# Patient Record
Sex: MALE | Race: WHITE
[De-identification: names, ages, dates, MRNs, and addresses within clinical notes are randomized per-mention and may not be internally consistent; named-entity substitution may affect disease eponyms.]

---

## 2022-02-24 ENCOUNTER — HOSPITAL ENCOUNTER (EMERGENCY)
Dept: HOSPITAL 4 - SED | Age: 87
Discharge: HOME | End: 2022-02-24
Payer: COMMERCIAL

## 2022-02-24 VITALS — SYSTOLIC BLOOD PRESSURE: 109 MMHG

## 2022-02-24 VITALS — HEIGHT: 64 IN | BODY MASS INDEX: 38.41 KG/M2 | WEIGHT: 225 LBS

## 2022-02-24 VITALS — SYSTOLIC BLOOD PRESSURE: 108 MMHG

## 2022-02-24 DIAGNOSIS — E11.9: ICD-10-CM

## 2022-02-24 DIAGNOSIS — M25.551: Primary | ICD-10-CM

## 2022-02-24 DIAGNOSIS — Z79.899: ICD-10-CM

## 2022-02-24 DIAGNOSIS — M25.561: ICD-10-CM

## 2022-02-24 DIAGNOSIS — M25.562: ICD-10-CM

## 2022-02-24 DIAGNOSIS — I10: ICD-10-CM

## 2022-02-24 NOTE — NUR
Patient/family given written and verbal discharge instructions and verbalizes 
understanding.  ER MD discussed with family the results and treatment provided. 
Patient in stable condition.  No Rx given. Patient educated on pain management 
and to follow up with PMD. Pain Scale 2/10. Opportunity for questions provided 
and answered. X-ray disc given to family.

## 2022-02-24 NOTE — NUR
Pt brought by MANUELA [email or web address], pt presents to ER with R hip/ R thigh/ R leg 
pain  for couple weeks, denies recent trauma, skin pink and warm, cap refill 
<3, VSS, respirations even and unlabored.

## 2023-08-06 ENCOUNTER — HOSPITAL ENCOUNTER (INPATIENT)
Dept: HOSPITAL 4 - SED | Age: 88
LOS: 9 days | Discharge: NURSING, REHAB OR LONG-TERM CARE | DRG: 393 | End: 2023-08-15
Attending: SPECIALIST | Admitting: SPECIALIST
Payer: COMMERCIAL

## 2023-08-06 VITALS
HEART RATE: 86 BPM | RESPIRATION RATE: 19 BRPM | BODY MASS INDEX: 32.95 KG/M2 | SYSTOLIC BLOOD PRESSURE: 122 MMHG | OXYGEN SATURATION: 97 % | WEIGHT: 193 LBS | TEMPERATURE: 98.2 F | HEIGHT: 64 IN

## 2023-08-06 VITALS — HEART RATE: 93 BPM | TEMPERATURE: 97.2 F | SYSTOLIC BLOOD PRESSURE: 100 MMHG | RESPIRATION RATE: 18 BRPM

## 2023-08-06 DIAGNOSIS — E78.5: ICD-10-CM

## 2023-08-06 DIAGNOSIS — K64.8: Primary | ICD-10-CM

## 2023-08-06 DIAGNOSIS — Z79.01: ICD-10-CM

## 2023-08-06 DIAGNOSIS — I42.9: ICD-10-CM

## 2023-08-06 DIAGNOSIS — K21.9: ICD-10-CM

## 2023-08-06 DIAGNOSIS — K80.20: ICD-10-CM

## 2023-08-06 DIAGNOSIS — K63.5: ICD-10-CM

## 2023-08-06 DIAGNOSIS — Z95.1: ICD-10-CM

## 2023-08-06 DIAGNOSIS — R79.1: ICD-10-CM

## 2023-08-06 DIAGNOSIS — D69.6: ICD-10-CM

## 2023-08-06 DIAGNOSIS — E11.40: ICD-10-CM

## 2023-08-06 DIAGNOSIS — I25.10: ICD-10-CM

## 2023-08-06 DIAGNOSIS — F02.80: ICD-10-CM

## 2023-08-06 DIAGNOSIS — I21.A1: ICD-10-CM

## 2023-08-06 DIAGNOSIS — Z86.73: ICD-10-CM

## 2023-08-06 DIAGNOSIS — E11.65: ICD-10-CM

## 2023-08-06 DIAGNOSIS — I11.0: ICD-10-CM

## 2023-08-06 DIAGNOSIS — K29.70: ICD-10-CM

## 2023-08-06 DIAGNOSIS — Z79.899: ICD-10-CM

## 2023-08-06 DIAGNOSIS — E87.6: ICD-10-CM

## 2023-08-06 DIAGNOSIS — E43: ICD-10-CM

## 2023-08-06 DIAGNOSIS — Z95.5: ICD-10-CM

## 2023-08-06 DIAGNOSIS — G30.9: ICD-10-CM

## 2023-08-06 DIAGNOSIS — N40.0: ICD-10-CM

## 2023-08-06 DIAGNOSIS — I50.9: ICD-10-CM

## 2023-08-06 DIAGNOSIS — D50.9: ICD-10-CM

## 2023-08-06 DIAGNOSIS — E83.51: ICD-10-CM

## 2023-08-06 LAB
ACETONE EXG QL: NEGATIVE
ALBUMIN SERPL BCP-MCNC: 2.7 G/DL (ref 3.4–4.8)
ALT SERPL W P-5'-P-CCNC: 8 U/L (ref 12–78)
ANION GAP SERPL CALCULATED.3IONS-SCNC: 11 MMOL/L (ref 5–15)
ANISOCYTOSIS BLD QL: (no result)
APPEARANCE UR: CLEAR
AST SERPL W P-5'-P-CCNC: 18 U/L (ref 10–37)
BASOPHILS # BLD AUTO: 0 K/UL (ref 0–0.2)
BASOPHILS NFR BLD AUTO: 0.5 % (ref 0–2)
BILIRUB SERPL-MCNC: 0.6 MG/DL (ref 0–1)
BILIRUB UR QL STRIP: NEGATIVE
BUN SERPL-MCNC: 21 MG/DL (ref 8–21)
CALCIUM SERPL-MCNC: 7.9 MG/DL (ref 8.4–11)
CHLORIDE SERPL-SCNC: 105 MMOL/L (ref 98–107)
CK SERPL-CCNC: 70 U/L (ref 39–308)
CO2 SERPLBLD-SCNC: 27 MMOL/L (ref 23–29)
COLOR UR: YELLOW
CREAT SERPL-MCNC: 1.22 MG/DL (ref 0.55–1.3)
EOSINOPHIL # BLD AUTO: 0 K/UL (ref 0–0.4)
EOSINOPHIL NFR BLD AUTO: 0.2 % (ref 0–4)
ERYTHROCYTE [DISTWIDTH] IN BLOOD BY AUTOMATED COUNT: 18.3 % (ref 9–15)
GFR SERPL CREATININE-BSD FRML MDRD: (no result) ML/MIN (ref 90–?)
GLUCOSE SERPL-MCNC: 122 MG/DL (ref 74–106)
GLUCOSE UR STRIP-MCNC: NEGATIVE MG/DL
HCT VFR BLD AUTO: 14.8 % (ref 36–54)
HGB BLD-MCNC: 4.3 G/DL (ref 14–18)
HGB UR QL STRIP: NEGATIVE
HYPOCHROMIA BLD QL: (no result)
INR PPP: 1.6 (ref 0.8–1.2)
KETONES UR STRIP-MCNC: NEGATIVE MG/DL
LEUKOCYTE ESTERASE UR QL STRIP: NEGATIVE
LYMPHOCYTES # BLD AUTO: 1.5 K/UL (ref 1–5.5)
LYMPHOCYTES NFR BLD AUTO: 21 % (ref 20.5–51.5)
MCH RBC QN AUTO: 20 PG (ref 27–31)
MCHC RBC AUTO-ENTMCNC: 29 % (ref 32–36)
MCV RBC AUTO: 68 FL (ref 79–98)
MONOCYTES # BLD MANUAL: 0.8 K/UL (ref 0–1)
MONOCYTES # BLD MANUAL: 10.9 % (ref 1.7–9.3)
NEUTROPHILS # BLD AUTO: 4.7 K/UL (ref 1.8–7.7)
NEUTROPHILS NFR BLD AUTO: 67.4 % (ref 40–70)
NITRITE UR QL STRIP: NEGATIVE
OVALOCYTES BLD QL SMEAR: (no result)
PH UR STRIP: 7.5 [PH] (ref 5–8)
PLATELET # BLD AUTO: 121 K/UL (ref 130–430)
POTASSIUM SERPL-SCNC: 4.2 MMOL/L (ref 3.5–5.1)
PROT SERPL-MCNC: 5.8 G/DL (ref 6.4–8.3)
PROT UR QL STRIP: NEGATIVE
PROTHROMBIN TIME: 16.2 SECS (ref 9.5–12.5)
RBC # BLD AUTO: 2.16 MIL/UL (ref 4.2–6.2)
SODIUM SERPLBLD-SCNC: 143 MMOL/L (ref 136–145)
SP GR UR STRIP: 1.01 (ref 1–1.03)
T4 FREE SERPL-MCNC: 1.1 NG/DL (ref 0.6–1.6)
TSH SERPL DL<=0.05 MIU/L-ACNC: 1.56 UIU/ML (ref 0.34–4.82)
UROBILINOGEN UR STRIP-MCNC: 0.2 MG/DL (ref 0.2–1)
WBC # BLD AUTO: 7 K/UL (ref 4.8–10.8)

## 2023-08-06 PROCEDURE — P9021 RED BLOOD CELLS UNIT: HCPCS

## 2023-08-06 PROCEDURE — G0378 HOSPITAL OBSERVATION PER HR: HCPCS

## 2023-08-06 PROCEDURE — 30233N1 TRANSFUSION OF NONAUTOLOGOUS RED BLOOD CELLS INTO PERIPHERAL VEIN, PERCUTANEOUS APPROACH: ICD-10-PCS | Performed by: FAMILY MEDICINE

## 2023-08-07 VITALS
SYSTOLIC BLOOD PRESSURE: 97 MMHG | OXYGEN SATURATION: 93 % | HEART RATE: 83 BPM | TEMPERATURE: 99.3 F | RESPIRATION RATE: 19 BRPM

## 2023-08-07 VITALS
TEMPERATURE: 98.2 F | HEART RATE: 83 BPM | SYSTOLIC BLOOD PRESSURE: 99 MMHG | OXYGEN SATURATION: 90 % | RESPIRATION RATE: 18 BRPM

## 2023-08-07 VITALS
HEART RATE: 84 BPM | SYSTOLIC BLOOD PRESSURE: 88 MMHG | RESPIRATION RATE: 20 BRPM | OXYGEN SATURATION: 95 % | TEMPERATURE: 98.5 F

## 2023-08-07 VITALS
HEART RATE: 88 BPM | SYSTOLIC BLOOD PRESSURE: 94 MMHG | OXYGEN SATURATION: 96 % | TEMPERATURE: 99 F | RESPIRATION RATE: 19 BRPM

## 2023-08-07 VITALS
RESPIRATION RATE: 14 BRPM | TEMPERATURE: 96 F | OXYGEN SATURATION: 96 % | HEART RATE: 65 BPM | SYSTOLIC BLOOD PRESSURE: 118 MMHG

## 2023-08-07 LAB
ALBUMIN SERPL BCP-MCNC: 2.4 G/DL (ref 3.4–4.8)
ALT SERPL W P-5'-P-CCNC: 6 U/L (ref 12–78)
ANION GAP SERPL CALCULATED.3IONS-SCNC: 8 MMOL/L (ref 5–15)
AST SERPL W P-5'-P-CCNC: 19 U/L (ref 10–37)
BASOPHILS # BLD AUTO: 0 K/UL (ref 0–0.2)
BASOPHILS NFR BLD AUTO: 0.6 % (ref 0–2)
BILIRUB SERPL-MCNC: 1.2 MG/DL (ref 0–1)
BUN SERPL-MCNC: 23 MG/DL (ref 8–21)
CALCIUM SERPL-MCNC: 7.5 MG/DL (ref 8.4–11)
CHLORIDE SERPL-SCNC: 107 MMOL/L (ref 98–107)
CO2 SERPLBLD-SCNC: 28 MMOL/L (ref 23–29)
CREAT SERPL-MCNC: 1.06 MG/DL (ref 0.55–1.3)
EOSINOPHIL # BLD AUTO: 0 K/UL (ref 0–0.4)
EOSINOPHIL NFR BLD AUTO: 0.4 % (ref 0–4)
ERYTHROCYTE [DISTWIDTH] IN BLOOD BY AUTOMATED COUNT: 20.8 % (ref 9–15)
GFR SERPL CREATININE-BSD FRML MDRD: (no result) ML/MIN (ref 90–?)
GLUCOSE SERPL-MCNC: 112 MG/DL (ref 74–106)
HCT VFR BLD AUTO: 21.3 % (ref 36–54)
HGB BLD-MCNC: 6.6 G/DL (ref 14–18)
LYMPHOCYTES # BLD AUTO: 1.3 K/UL (ref 1–5.5)
LYMPHOCYTES NFR BLD AUTO: 19 % (ref 20.5–51.5)
MCH RBC QN AUTO: 23 PG (ref 27–31)
MCHC RBC AUTO-ENTMCNC: 31 % (ref 32–36)
MCV RBC AUTO: 73 FL (ref 79–98)
MONOCYTES # BLD MANUAL: 0.8 K/UL (ref 0–1)
MONOCYTES # BLD MANUAL: 11.6 % (ref 1.7–9.3)
NEUTROPHILS # BLD AUTO: 4.8 K/UL (ref 1.8–7.7)
NEUTROPHILS NFR BLD AUTO: 68.4 % (ref 40–70)
PHOSPHATE SERPL-MCNC: 3.1 MG/DL (ref 2.7–4.5)
PLATELET # BLD AUTO: 108 K/UL (ref 130–430)
POTASSIUM SERPL-SCNC: 3.8 MMOL/L (ref 3.5–5.1)
PROT SERPL-MCNC: 5.2 G/DL (ref 6.4–8.3)
RBC # BLD AUTO: 2.91 MIL/UL (ref 4.2–6.2)
SODIUM SERPLBLD-SCNC: 143 MMOL/L (ref 136–145)
WBC # BLD AUTO: 7 K/UL (ref 4.8–10.8)

## 2023-08-07 RX ADMIN — Medication SCH UNIT: at 08:29

## 2023-08-07 RX ADMIN — FINASTERIDE SCH MG: 5 TABLET, FILM COATED ORAL at 08:30

## 2023-08-07 RX ADMIN — PANTOPRAZOLE SODIUM SCH MG: 40 TABLET, DELAYED RELEASE ORAL at 08:29

## 2023-08-07 RX ADMIN — MECLIZINE HYDROCHLORIDE SCH MG: 25 TABLET ORAL at 08:29

## 2023-08-07 RX ADMIN — SIMVASTATIN SCH MG: 40 TABLET, FILM COATED ORAL at 20:51

## 2023-08-07 RX ADMIN — TAMSULOSIN HYDROCHLORIDE SCH MG: 0.4 CAPSULE ORAL at 08:30

## 2023-08-07 RX ADMIN — LUBIPROSTONE SCH MCG: 24 CAPSULE, GELATIN COATED ORAL at 08:30

## 2023-08-07 RX ADMIN — POTASSIUM CHLORIDE SCH MEQ: 750 TABLET, FILM COATED, EXTENDED RELEASE ORAL at 09:31

## 2023-08-07 RX ADMIN — Medication SCH GM: at 08:30

## 2023-08-07 RX ADMIN — GABAPENTIN SCH MG: 300 CAPSULE ORAL at 20:51

## 2023-08-07 RX ADMIN — MECLIZINE HYDROCHLORIDE SCH MG: 25 TABLET ORAL at 20:51

## 2023-08-07 RX ADMIN — GLYCERIN SCH ML: .002; .002; .01 SOLUTION/ DROPS OPHTHALMIC at 14:03

## 2023-08-07 RX ADMIN — Medication SCH MCG: at 08:30

## 2023-08-07 RX ADMIN — MECLIZINE HYDROCHLORIDE SCH MG: 25 TABLET ORAL at 18:07

## 2023-08-07 RX ADMIN — GABAPENTIN SCH MG: 300 CAPSULE ORAL at 08:30

## 2023-08-07 RX ADMIN — CLOPIDOGREL BISULFATE SCH MG: 75 TABLET, FILM COATED ORAL at 09:27

## 2023-08-07 RX ADMIN — GABAPENTIN SCH MG: 300 CAPSULE ORAL at 18:06

## 2023-08-07 RX ADMIN — FUROSEMIDE SCH MG: 40 TABLET ORAL at 08:29

## 2023-08-07 RX ADMIN — Medication SCH ML: at 20:54

## 2023-08-07 RX ADMIN — CYCLOSPORINE SCH ML: 0.5 EMULSION OPHTHALMIC at 20:55

## 2023-08-07 RX ADMIN — LUBIPROSTONE SCH MCG: 24 CAPSULE, GELATIN COATED ORAL at 20:51

## 2023-08-07 RX ADMIN — Medication SCH GM: at 20:51

## 2023-08-07 RX ADMIN — CYCLOSPORINE SCH ML: 0.5 EMULSION OPHTHALMIC at 09:34

## 2023-08-07 RX ADMIN — MULTIPLE VITAMINS W/ MINERALS TAB SCH TAB: TAB at 08:29

## 2023-08-08 VITALS
TEMPERATURE: 96.9 F | HEART RATE: 76 BPM | RESPIRATION RATE: 19 BRPM | SYSTOLIC BLOOD PRESSURE: 96 MMHG | OXYGEN SATURATION: 96 %

## 2023-08-08 VITALS
SYSTOLIC BLOOD PRESSURE: 116 MMHG | HEART RATE: 76 BPM | OXYGEN SATURATION: 96 % | RESPIRATION RATE: 19 BRPM | TEMPERATURE: 97.6 F

## 2023-08-08 VITALS
RESPIRATION RATE: 14 BRPM | OXYGEN SATURATION: 96 % | TEMPERATURE: 97.6 F | HEART RATE: 78 BPM | SYSTOLIC BLOOD PRESSURE: 100 MMHG

## 2023-08-08 VITALS
OXYGEN SATURATION: 96 % | HEART RATE: 76 BPM | TEMPERATURE: 98 F | SYSTOLIC BLOOD PRESSURE: 138 MMHG | RESPIRATION RATE: 14 BRPM

## 2023-08-08 VITALS
OXYGEN SATURATION: 95 % | HEART RATE: 93 BPM | TEMPERATURE: 99.7 F | RESPIRATION RATE: 20 BRPM | SYSTOLIC BLOOD PRESSURE: 113 MMHG

## 2023-08-08 VITALS — OXYGEN SATURATION: 97 %

## 2023-08-08 VITALS — OXYGEN SATURATION: 96 %

## 2023-08-08 LAB
ANION GAP SERPL CALCULATED.3IONS-SCNC: 5 MMOL/L (ref 5–15)
BASOPHILS # BLD AUTO: 0 K/UL (ref 0–0.2)
BASOPHILS NFR BLD AUTO: 0.4 % (ref 0–2)
BUN SERPL-MCNC: 19 MG/DL (ref 8–21)
CALCIUM SERPL-MCNC: 7.8 MG/DL (ref 8.4–11)
CHLORIDE SERPL-SCNC: 106 MMOL/L (ref 98–107)
CO2 SERPLBLD-SCNC: 28 MMOL/L (ref 23–29)
CREAT SERPL-MCNC: 1.01 MG/DL (ref 0.55–1.3)
EOSINOPHIL # BLD AUTO: 0.1 K/UL (ref 0–0.4)
EOSINOPHIL NFR BLD AUTO: 1.5 % (ref 0–4)
ERYTHROCYTE [DISTWIDTH] IN BLOOD BY AUTOMATED COUNT: 20.6 % (ref 9–15)
GFR SERPL CREATININE-BSD FRML MDRD: (no result) ML/MIN (ref 90–?)
GLUCOSE SERPL-MCNC: 111 MG/DL (ref 74–106)
HCT VFR BLD AUTO: 27.7 % (ref 36–54)
HGB BLD-MCNC: 8.8 G/DL (ref 14–18)
LYMPHOCYTES # BLD AUTO: 1.7 K/UL (ref 1–5.5)
LYMPHOCYTES NFR BLD AUTO: 23.4 % (ref 20.5–51.5)
MCH RBC QN AUTO: 24 PG (ref 27–31)
MCHC RBC AUTO-ENTMCNC: 32 % (ref 32–36)
MCV RBC AUTO: 76 FL (ref 79–98)
MONOCYTES # BLD MANUAL: 0.8 K/UL (ref 0–1)
MONOCYTES # BLD MANUAL: 10.8 % (ref 1.7–9.3)
NEUTROPHILS # BLD AUTO: 4.6 K/UL (ref 1.8–7.7)
NEUTROPHILS NFR BLD AUTO: 63.9 % (ref 40–70)
PLATELET # BLD AUTO: 97 K/UL (ref 130–430)
POTASSIUM SERPL-SCNC: 3.3 MMOL/L (ref 3.5–5.1)
RBC # BLD AUTO: 3.65 MIL/UL (ref 4.2–6.2)
RETICS #: 2.2 % (ref 0.5–1.5)
SODIUM SERPLBLD-SCNC: 139 MMOL/L (ref 136–145)
TIBC SERPL-MCNC: 310 UG/DL (ref 250–450)
WBC # BLD AUTO: 7.2 K/UL (ref 4.8–10.8)

## 2023-08-08 RX ADMIN — Medication SCH GM: at 08:56

## 2023-08-08 RX ADMIN — SIMVASTATIN SCH MG: 40 TABLET, FILM COATED ORAL at 21:33

## 2023-08-08 RX ADMIN — GABAPENTIN SCH MG: 300 CAPSULE ORAL at 15:25

## 2023-08-08 RX ADMIN — GABAPENTIN SCH MG: 300 CAPSULE ORAL at 21:36

## 2023-08-08 RX ADMIN — FINASTERIDE SCH MG: 5 TABLET, FILM COATED ORAL at 08:56

## 2023-08-08 RX ADMIN — Medication SCH UNIT: at 08:56

## 2023-08-08 RX ADMIN — MECLIZINE HYDROCHLORIDE SCH MG: 25 TABLET ORAL at 08:56

## 2023-08-08 RX ADMIN — LUBIPROSTONE SCH MCG: 24 CAPSULE, GELATIN COATED ORAL at 21:32

## 2023-08-08 RX ADMIN — Medication SCH ML: at 21:35

## 2023-08-08 RX ADMIN — TAMSULOSIN HYDROCHLORIDE SCH MG: 0.4 CAPSULE ORAL at 08:56

## 2023-08-08 RX ADMIN — GABAPENTIN SCH MG: 300 CAPSULE ORAL at 08:56

## 2023-08-08 RX ADMIN — Medication SCH ML: at 05:11

## 2023-08-08 RX ADMIN — FUROSEMIDE SCH MG: 40 TABLET ORAL at 08:55

## 2023-08-08 RX ADMIN — MECLIZINE HYDROCHLORIDE SCH MG: 25 TABLET ORAL at 21:33

## 2023-08-08 RX ADMIN — Medication SCH ML: at 15:25

## 2023-08-08 RX ADMIN — CYCLOSPORINE SCH ML: 0.5 EMULSION OPHTHALMIC at 21:35

## 2023-08-08 RX ADMIN — Medication SCH MCG: at 08:57

## 2023-08-08 RX ADMIN — MECLIZINE HYDROCHLORIDE SCH MG: 25 TABLET ORAL at 15:25

## 2023-08-08 RX ADMIN — CLOPIDOGREL BISULFATE SCH MG: 75 TABLET, FILM COATED ORAL at 08:56

## 2023-08-08 RX ADMIN — LUBIPROSTONE SCH MCG: 24 CAPSULE, GELATIN COATED ORAL at 08:56

## 2023-08-08 RX ADMIN — POTASSIUM CHLORIDE SCH MEQ: 750 TABLET, FILM COATED, EXTENDED RELEASE ORAL at 08:55

## 2023-08-08 RX ADMIN — PANTOPRAZOLE SODIUM SCH MG: 40 TABLET, DELAYED RELEASE ORAL at 08:56

## 2023-08-08 RX ADMIN — Medication SCH GM: at 21:33

## 2023-08-08 RX ADMIN — MULTIPLE VITAMINS W/ MINERALS TAB SCH TAB: TAB at 08:56

## 2023-08-08 RX ADMIN — GLYCERIN SCH ML: .002; .002; .01 SOLUTION/ DROPS OPHTHALMIC at 08:57

## 2023-08-08 RX ADMIN — CYCLOSPORINE SCH ML: 0.5 EMULSION OPHTHALMIC at 08:59

## 2023-08-09 VITALS
OXYGEN SATURATION: 94 % | TEMPERATURE: 98.3 F | HEART RATE: 80 BPM | RESPIRATION RATE: 18 BRPM | SYSTOLIC BLOOD PRESSURE: 106 MMHG

## 2023-08-09 VITALS
SYSTOLIC BLOOD PRESSURE: 104 MMHG | HEART RATE: 78 BPM | OXYGEN SATURATION: 93 % | RESPIRATION RATE: 17 BRPM | TEMPERATURE: 98.4 F

## 2023-08-09 VITALS
OXYGEN SATURATION: 95 % | SYSTOLIC BLOOD PRESSURE: 112 MMHG | HEART RATE: 89 BPM | TEMPERATURE: 98.1 F | RESPIRATION RATE: 18 BRPM

## 2023-08-09 VITALS
SYSTOLIC BLOOD PRESSURE: 106 MMHG | HEART RATE: 91 BPM | TEMPERATURE: 98.1 F | RESPIRATION RATE: 20 BRPM | OXYGEN SATURATION: 95 %

## 2023-08-09 VITALS — OXYGEN SATURATION: 95 %

## 2023-08-09 VITALS
OXYGEN SATURATION: 96 % | RESPIRATION RATE: 19 BRPM | HEART RATE: 84 BPM | SYSTOLIC BLOOD PRESSURE: 109 MMHG | TEMPERATURE: 98 F

## 2023-08-09 LAB
A1AT SERPL-MCNC: 164 MG/DL (ref 101–187)
ALBUMIN SERPL BCP-MCNC: 2.6 G/DL (ref 3.4–4.8)
ALT SERPL W P-5'-P-CCNC: 7 U/L (ref 12–78)
ANION GAP SERPL CALCULATED.3IONS-SCNC: 5 MMOL/L (ref 5–15)
AST SERPL W P-5'-P-CCNC: 21 U/L (ref 10–37)
BASOPHILS # BLD AUTO: 0 K/UL (ref 0–0.2)
BASOPHILS NFR BLD AUTO: 0.2 % (ref 0–2)
BILIRUB SERPL-MCNC: 0.9 MG/DL (ref 0–1)
BUN SERPL-MCNC: 22 MG/DL (ref 8–21)
CALCIUM SERPL-MCNC: 8.1 MG/DL (ref 8.4–11)
CHLORIDE SERPL-SCNC: 107 MMOL/L (ref 98–107)
CO2 SERPLBLD-SCNC: 29 MMOL/L (ref 23–29)
CREAT SERPL-MCNC: 1.07 MG/DL (ref 0.55–1.3)
EOSINOPHIL # BLD AUTO: 0 K/UL (ref 0–0.4)
EOSINOPHIL NFR BLD AUTO: 0.3 % (ref 0–4)
ERYTHROCYTE [DISTWIDTH] IN BLOOD BY AUTOMATED COUNT: 21.2 % (ref 9–15)
FERRITIN: 16 NG/ML (ref 30–400)
FOLATE (FOLIC ACID): 6.2 NG/ML (ref 3–?)
GFR SERPL CREATININE-BSD FRML MDRD: (no result) ML/MIN (ref 90–?)
GLUCOSE SERPL-MCNC: 133 MG/DL (ref 74–106)
HAPTOGLOBIN: 187 MG/DL (ref 38–329)
HCT VFR BLD AUTO: 28.6 % (ref 36–54)
HGB BLD-MCNC: 9.2 G/DL (ref 14–18)
LYMPHOCYTES # BLD AUTO: 2 K/UL (ref 1–5.5)
LYMPHOCYTES NFR BLD AUTO: 19.7 % (ref 20.5–51.5)
MCH RBC QN AUTO: 25 PG (ref 27–31)
MCHC RBC AUTO-ENTMCNC: 32 % (ref 32–36)
MCV RBC AUTO: 77 FL (ref 79–98)
MONOCYTES # BLD MANUAL: 1.3 K/UL (ref 0–1)
MONOCYTES # BLD MANUAL: 12.8 % (ref 1.7–9.3)
NEUTROPHILS # BLD AUTO: 6.9 K/UL (ref 1.8–7.7)
NEUTROPHILS NFR BLD AUTO: 67 % (ref 40–70)
PLATELET # BLD AUTO: 103 K/UL (ref 130–430)
POTASSIUM SERPL-SCNC: 3.8 MMOL/L (ref 3.5–5.1)
PROT SERPL-MCNC: 5.2 G/DL (ref 6.4–8.3)
RBC # BLD AUTO: 3.73 MIL/UL (ref 4.2–6.2)
SODIUM SERPLBLD-SCNC: 141 MMOL/L (ref 136–145)
VIT B12 SERPL-MCNC: 465 PG/ML (ref 232–1245)
WBC # BLD AUTO: 10.3 K/UL (ref 4.8–10.8)

## 2023-08-09 RX ADMIN — MECLIZINE HYDROCHLORIDE SCH MG: 25 TABLET ORAL at 22:13

## 2023-08-09 RX ADMIN — FINASTERIDE SCH MG: 5 TABLET, FILM COATED ORAL at 10:37

## 2023-08-09 RX ADMIN — SODIUM FERRIC GLUCONATE COMPLEX IN SUCROSE SCH MLS/HR: 12.5 INJECTION INTRAVENOUS at 19:27

## 2023-08-09 RX ADMIN — MULTIPLE VITAMINS W/ MINERALS TAB SCH TAB: TAB at 10:35

## 2023-08-09 RX ADMIN — Medication SCH MCG: at 10:36

## 2023-08-09 RX ADMIN — Medication SCH GM: at 22:13

## 2023-08-09 RX ADMIN — MECLIZINE HYDROCHLORIDE SCH MG: 25 TABLET ORAL at 14:52

## 2023-08-09 RX ADMIN — TAMSULOSIN HYDROCHLORIDE SCH MG: 0.4 CAPSULE ORAL at 10:35

## 2023-08-09 RX ADMIN — Medication SCH GM: at 22:37

## 2023-08-09 RX ADMIN — Medication SCH GM: at 10:36

## 2023-08-09 RX ADMIN — Medication SCH GM: at 17:00

## 2023-08-09 RX ADMIN — FUROSEMIDE SCH MG: 40 TABLET ORAL at 10:36

## 2023-08-09 RX ADMIN — Medication SCH ML: at 14:52

## 2023-08-09 RX ADMIN — CLOPIDOGREL BISULFATE SCH MG: 75 TABLET, FILM COATED ORAL at 10:38

## 2023-08-09 RX ADMIN — Medication SCH ML: at 06:56

## 2023-08-09 RX ADMIN — GABAPENTIN SCH MG: 300 CAPSULE ORAL at 22:13

## 2023-08-09 RX ADMIN — LUBIPROSTONE SCH MCG: 24 CAPSULE, GELATIN COATED ORAL at 10:38

## 2023-08-09 RX ADMIN — LUBIPROSTONE SCH MCG: 24 CAPSULE, GELATIN COATED ORAL at 22:13

## 2023-08-09 RX ADMIN — GABAPENTIN SCH MG: 300 CAPSULE ORAL at 10:38

## 2023-08-09 RX ADMIN — Medication SCH UNIT: at 10:35

## 2023-08-09 RX ADMIN — GABAPENTIN SCH MG: 300 CAPSULE ORAL at 14:52

## 2023-08-09 RX ADMIN — CYCLOSPORINE SCH ML: 0.5 EMULSION OPHTHALMIC at 22:18

## 2023-08-09 RX ADMIN — SIMVASTATIN SCH MG: 40 TABLET, FILM COATED ORAL at 22:14

## 2023-08-09 RX ADMIN — CYCLOSPORINE SCH ML: 0.5 EMULSION OPHTHALMIC at 10:38

## 2023-08-09 RX ADMIN — MECLIZINE HYDROCHLORIDE SCH MG: 25 TABLET ORAL at 10:38

## 2023-08-09 RX ADMIN — Medication SCH ML: at 22:38

## 2023-08-09 RX ADMIN — GLYCERIN SCH ML: .002; .002; .01 SOLUTION/ DROPS OPHTHALMIC at 10:37

## 2023-08-09 RX ADMIN — PANTOPRAZOLE SODIUM SCH MG: 40 TABLET, DELAYED RELEASE ORAL at 10:35

## 2023-08-09 RX ADMIN — POTASSIUM CHLORIDE SCH MEQ: 750 TABLET, FILM COATED, EXTENDED RELEASE ORAL at 10:35

## 2023-08-10 VITALS — SYSTOLIC BLOOD PRESSURE: 104 MMHG | HEART RATE: 91 BPM | OXYGEN SATURATION: 96 %

## 2023-08-10 VITALS
RESPIRATION RATE: 16 BRPM | OXYGEN SATURATION: 91 % | SYSTOLIC BLOOD PRESSURE: 118 MMHG | HEART RATE: 84 BPM | TEMPERATURE: 99.6 F

## 2023-08-10 VITALS
HEART RATE: 90 BPM | SYSTOLIC BLOOD PRESSURE: 111 MMHG | RESPIRATION RATE: 20 BRPM | TEMPERATURE: 100.5 F | OXYGEN SATURATION: 95 %

## 2023-08-10 VITALS — HEART RATE: 98 BPM | TEMPERATURE: 98.2 F

## 2023-08-10 VITALS
OXYGEN SATURATION: 96 % | SYSTOLIC BLOOD PRESSURE: 104 MMHG | HEART RATE: 91 BPM | TEMPERATURE: 99.2 F | RESPIRATION RATE: 20 BRPM

## 2023-08-10 VITALS — HEART RATE: 90 BPM

## 2023-08-10 VITALS — HEART RATE: 88 BPM

## 2023-08-10 VITALS
RESPIRATION RATE: 18 BRPM | OXYGEN SATURATION: 99 % | SYSTOLIC BLOOD PRESSURE: 114 MMHG | TEMPERATURE: 98.4 F | HEART RATE: 99 BPM

## 2023-08-10 VITALS — HEART RATE: 91 BPM

## 2023-08-10 LAB
ACTIN IGG SERPL-ACNC: 5 UNITS (ref 0–19)
ANION GAP SERPL CALCULATED.3IONS-SCNC: 6 MMOL/L (ref 5–15)
BASOPHILS # BLD AUTO: 0 K/UL (ref 0–0.2)
BASOPHILS NFR BLD AUTO: 0.1 % (ref 0–2)
BUN SERPL-MCNC: 23 MG/DL (ref 8–21)
CALCIUM SERPL-MCNC: 7.8 MG/DL (ref 8.4–11)
CHLORIDE SERPL-SCNC: 106 MMOL/L (ref 98–107)
CO2 SERPLBLD-SCNC: 29 MMOL/L (ref 23–29)
CREAT SERPL-MCNC: 1 MG/DL (ref 0.55–1.3)
EOSINOPHIL # BLD AUTO: 0.1 K/UL (ref 0–0.4)
EOSINOPHIL NFR BLD AUTO: 0.6 % (ref 0–4)
ERYTHROCYTE [DISTWIDTH] IN BLOOD BY AUTOMATED COUNT: 22.1 % (ref 9–15)
GFR SERPL CREATININE-BSD FRML MDRD: (no result) ML/MIN (ref 90–?)
GLUCOSE SERPL-MCNC: 125 MG/DL (ref 74–106)
HCT VFR BLD AUTO: 28.1 % (ref 36–54)
HGB BLD-MCNC: 8.6 G/DL (ref 14–18)
INR PPP: 1.2 (ref 0.8–1.2)
LYMPHOCYTES # BLD AUTO: 1.7 K/UL (ref 1–5.5)
LYMPHOCYTES NFR BLD AUTO: 17 % (ref 20.5–51.5)
MCH RBC QN AUTO: 24 PG (ref 27–31)
MCHC RBC AUTO-ENTMCNC: 31 % (ref 32–36)
MCV RBC AUTO: 78 FL (ref 79–98)
MONOCYTES # BLD MANUAL: 1.3 K/UL (ref 0–1)
MONOCYTES # BLD MANUAL: 12.3 % (ref 1.7–9.3)
NEUTROPHILS # BLD AUTO: 7.2 K/UL (ref 1.8–7.7)
NEUTROPHILS NFR BLD AUTO: 70 % (ref 40–70)
PLATELET # BLD AUTO: 93 K/UL (ref 130–430)
POTASSIUM SERPL-SCNC: 3.6 MMOL/L (ref 3.5–5.1)
PROTHROMBIN TIME: 12.3 SECS (ref 9.5–12.5)
RBC # BLD AUTO: 3.6 MIL/UL (ref 4.2–6.2)
SODIUM SERPLBLD-SCNC: 141 MMOL/L (ref 136–145)
WBC # BLD AUTO: 10.3 K/UL (ref 4.8–10.8)

## 2023-08-10 RX ADMIN — LUBIPROSTONE SCH MCG: 24 CAPSULE, GELATIN COATED ORAL at 22:17

## 2023-08-10 RX ADMIN — GLYCERIN SCH ML: .002; .002; .01 SOLUTION/ DROPS OPHTHALMIC at 10:24

## 2023-08-10 RX ADMIN — Medication SCH ML: at 22:23

## 2023-08-10 RX ADMIN — MULTIPLE VITAMINS W/ MINERALS TAB SCH TAB: TAB at 10:13

## 2023-08-10 RX ADMIN — SIMVASTATIN SCH MG: 40 TABLET, FILM COATED ORAL at 22:22

## 2023-08-10 RX ADMIN — Medication SCH MCG: at 10:16

## 2023-08-10 RX ADMIN — Medication SCH ML: at 17:19

## 2023-08-10 RX ADMIN — FUROSEMIDE SCH MG: 40 TABLET ORAL at 10:16

## 2023-08-10 RX ADMIN — Medication SCH GM: at 13:26

## 2023-08-10 RX ADMIN — SODIUM FERRIC GLUCONATE COMPLEX IN SUCROSE SCH MLS/HR: 12.5 INJECTION INTRAVENOUS at 17:17

## 2023-08-10 RX ADMIN — LUBIPROSTONE SCH MCG: 24 CAPSULE, GELATIN COATED ORAL at 10:12

## 2023-08-10 RX ADMIN — Medication SCH ML: at 13:27

## 2023-08-10 RX ADMIN — Medication SCH GM: at 10:16

## 2023-08-10 RX ADMIN — MECLIZINE HYDROCHLORIDE SCH MG: 25 TABLET ORAL at 15:14

## 2023-08-10 RX ADMIN — CYCLOSPORINE SCH ML: 0.5 EMULSION OPHTHALMIC at 22:19

## 2023-08-10 RX ADMIN — Medication SCH GM: at 22:17

## 2023-08-10 RX ADMIN — GABAPENTIN SCH MG: 300 CAPSULE ORAL at 22:20

## 2023-08-10 RX ADMIN — Medication SCH ML: at 17:17

## 2023-08-10 RX ADMIN — TAMSULOSIN HYDROCHLORIDE SCH MG: 0.4 CAPSULE ORAL at 10:16

## 2023-08-10 RX ADMIN — FINASTERIDE SCH MG: 5 TABLET, FILM COATED ORAL at 10:13

## 2023-08-10 RX ADMIN — Medication SCH GM: at 17:16

## 2023-08-10 RX ADMIN — PANTOPRAZOLE SODIUM SCH MG: 40 TABLET, DELAYED RELEASE ORAL at 10:16

## 2023-08-10 RX ADMIN — GABAPENTIN SCH MG: 300 CAPSULE ORAL at 15:14

## 2023-08-10 RX ADMIN — Medication SCH GM: at 22:19

## 2023-08-10 RX ADMIN — MECLIZINE HYDROCHLORIDE SCH MG: 25 TABLET ORAL at 22:18

## 2023-08-10 RX ADMIN — MECLIZINE HYDROCHLORIDE SCH MG: 25 TABLET ORAL at 10:13

## 2023-08-10 RX ADMIN — Medication SCH UNIT: at 10:13

## 2023-08-10 RX ADMIN — POTASSIUM CHLORIDE SCH MEQ: 750 TABLET, FILM COATED, EXTENDED RELEASE ORAL at 10:13

## 2023-08-10 RX ADMIN — CYCLOSPORINE SCH ML: 0.5 EMULSION OPHTHALMIC at 10:19

## 2023-08-10 RX ADMIN — GABAPENTIN SCH MG: 300 CAPSULE ORAL at 10:16

## 2023-08-10 RX ADMIN — Medication SCH GM: at 10:19

## 2023-08-11 VITALS
OXYGEN SATURATION: 92 % | HEART RATE: 82 BPM | TEMPERATURE: 99.8 F | SYSTOLIC BLOOD PRESSURE: 107 MMHG | RESPIRATION RATE: 16 BRPM

## 2023-08-11 VITALS
RESPIRATION RATE: 16 BRPM | SYSTOLIC BLOOD PRESSURE: 121 MMHG | TEMPERATURE: 98.5 F | OXYGEN SATURATION: 97 % | HEART RATE: 94 BPM

## 2023-08-11 VITALS — HEART RATE: 93 BPM

## 2023-08-11 VITALS
RESPIRATION RATE: 14 BRPM | TEMPERATURE: 98.7 F | SYSTOLIC BLOOD PRESSURE: 101 MMHG | OXYGEN SATURATION: 92 % | HEART RATE: 74 BPM

## 2023-08-11 VITALS
HEART RATE: 82 BPM | RESPIRATION RATE: 16 BRPM | TEMPERATURE: 98.5 F | SYSTOLIC BLOOD PRESSURE: 108 MMHG | OXYGEN SATURATION: 92 %

## 2023-08-11 VITALS
TEMPERATURE: 98.2 F | OXYGEN SATURATION: 93 % | SYSTOLIC BLOOD PRESSURE: 98 MMHG | RESPIRATION RATE: 20 BRPM | HEART RATE: 70 BPM

## 2023-08-11 VITALS — OXYGEN SATURATION: 92 %

## 2023-08-11 VITALS — HEART RATE: 88 BPM

## 2023-08-11 LAB
ALBUMIN SERPL BCP-MCNC: 2.1 G/DL (ref 3.4–4.8)
ALT SERPL W P-5'-P-CCNC: 3 U/L (ref 12–78)
ANION GAP SERPL CALCULATED.3IONS-SCNC: 8 MMOL/L (ref 5–15)
AST SERPL W P-5'-P-CCNC: 16 U/L (ref 10–37)
BASOPHILS # BLD AUTO: 0 K/UL (ref 0–0.2)
BASOPHILS NFR BLD AUTO: 0.2 % (ref 0–2)
BILIRUB SERPL-MCNC: 0.9 MG/DL (ref 0–1)
BUN SERPL-MCNC: 22 MG/DL (ref 8–21)
CALCIUM SERPL-MCNC: 8 MG/DL (ref 8.4–11)
CHLORIDE SERPL-SCNC: 105 MMOL/L (ref 98–107)
CO2 SERPLBLD-SCNC: 28 MMOL/L (ref 23–29)
CREAT SERPL-MCNC: 0.99 MG/DL (ref 0.55–1.3)
EOSINOPHIL # BLD AUTO: 0 K/UL (ref 0–0.4)
EOSINOPHIL NFR BLD AUTO: 0 % (ref 0–4)
ERYTHROCYTE [DISTWIDTH] IN BLOOD BY AUTOMATED COUNT: 22.7 % (ref 9–15)
GFR SERPL CREATININE-BSD FRML MDRD: (no result) ML/MIN (ref 90–?)
GLUCOSE SERPL-MCNC: 148 MG/DL (ref 74–106)
HCT VFR BLD AUTO: 28.5 % (ref 36–54)
HGB BLD-MCNC: 8.8 G/DL (ref 14–18)
LYMPHOCYTES # BLD AUTO: 1.2 K/UL (ref 1–5.5)
LYMPHOCYTES NFR BLD AUTO: 10.4 % (ref 20.5–51.5)
MCH RBC QN AUTO: 24 PG (ref 27–31)
MCHC RBC AUTO-ENTMCNC: 31 % (ref 32–36)
MCV RBC AUTO: 79 FL (ref 79–98)
MONOCYTES # BLD MANUAL: 1.5 K/UL (ref 0–1)
MONOCYTES # BLD MANUAL: 12.5 % (ref 1.7–9.3)
NEUTROPHILS # BLD AUTO: 9 K/UL (ref 1.8–7.7)
NEUTROPHILS NFR BLD AUTO: 76.9 % (ref 40–70)
PLATELET # BLD AUTO: 88 K/UL (ref 130–430)
POTASSIUM SERPL-SCNC: 2.8 MMOL/L (ref 3.5–5.1)
PROT SERPL-MCNC: 5.4 G/DL (ref 6.4–8.3)
RBC # BLD AUTO: 3.63 MIL/UL (ref 4.2–6.2)
SODIUM SERPLBLD-SCNC: 141 MMOL/L (ref 136–145)
WBC # BLD AUTO: 11.7 K/UL (ref 4.8–10.8)

## 2023-08-11 PROCEDURE — 0DBL8ZZ EXCISION OF TRANSVERSE COLON, VIA NATURAL OR ARTIFICIAL OPENING ENDOSCOPIC: ICD-10-PCS | Performed by: INTERNAL MEDICINE

## 2023-08-11 PROCEDURE — 0DB68ZX EXCISION OF STOMACH, VIA NATURAL OR ARTIFICIAL OPENING ENDOSCOPIC, DIAGNOSTIC: ICD-10-PCS | Performed by: INTERNAL MEDICINE

## 2023-08-11 PROCEDURE — 0DB78ZX EXCISION OF STOMACH, PYLORUS, VIA NATURAL OR ARTIFICIAL OPENING ENDOSCOPIC, DIAGNOSTIC: ICD-10-PCS | Performed by: INTERNAL MEDICINE

## 2023-08-11 PROCEDURE — 0DB98ZX EXCISION OF DUODENUM, VIA NATURAL OR ARTIFICIAL OPENING ENDOSCOPIC, DIAGNOSTIC: ICD-10-PCS | Performed by: INTERNAL MEDICINE

## 2023-08-11 PROCEDURE — 0DBK8ZZ EXCISION OF ASCENDING COLON, VIA NATURAL OR ARTIFICIAL OPENING ENDOSCOPIC: ICD-10-PCS | Performed by: INTERNAL MEDICINE

## 2023-08-11 RX ADMIN — LUBIPROSTONE SCH MCG: 24 CAPSULE, GELATIN COATED ORAL at 21:43

## 2023-08-11 RX ADMIN — Medication SCH GM: at 17:00

## 2023-08-11 RX ADMIN — GABAPENTIN SCH MG: 300 CAPSULE ORAL at 16:17

## 2023-08-11 RX ADMIN — MECLIZINE HYDROCHLORIDE SCH MG: 25 TABLET ORAL at 21:44

## 2023-08-11 RX ADMIN — SIMVASTATIN SCH MG: 40 TABLET, FILM COATED ORAL at 21:42

## 2023-08-11 RX ADMIN — Medication SCH ML: at 10:09

## 2023-08-11 RX ADMIN — Medication SCH MCG: at 09:00

## 2023-08-11 RX ADMIN — GLYCERIN SCH DROP: .002; .002; .01 SOLUTION/ DROPS OPHTHALMIC at 10:11

## 2023-08-11 RX ADMIN — Medication SCH UNIT: at 09:00

## 2023-08-11 RX ADMIN — TAMSULOSIN HYDROCHLORIDE SCH MG: 0.4 CAPSULE ORAL at 09:00

## 2023-08-11 RX ADMIN — MECLIZINE HYDROCHLORIDE SCH MG: 25 TABLET ORAL at 16:17

## 2023-08-11 RX ADMIN — GABAPENTIN SCH MG: 300 CAPSULE ORAL at 21:43

## 2023-08-11 RX ADMIN — CYCLOSPORINE SCH ML: 0.5 EMULSION OPHTHALMIC at 21:44

## 2023-08-11 RX ADMIN — Medication SCH GM: at 09:00

## 2023-08-11 RX ADMIN — Medication SCH ML: at 22:11

## 2023-08-11 RX ADMIN — PANTOPRAZOLE SODIUM SCH MG: 40 TABLET, DELAYED RELEASE ORAL at 09:00

## 2023-08-11 RX ADMIN — GABAPENTIN SCH MG: 300 CAPSULE ORAL at 09:00

## 2023-08-11 RX ADMIN — Medication SCH GM: at 13:00

## 2023-08-11 RX ADMIN — MECLIZINE HYDROCHLORIDE SCH MG: 25 TABLET ORAL at 09:00

## 2023-08-11 RX ADMIN — SODIUM FERRIC GLUCONATE COMPLEX IN SUCROSE SCH MLS/HR: 12.5 INJECTION INTRAVENOUS at 16:47

## 2023-08-11 RX ADMIN — Medication SCH GM: at 21:44

## 2023-08-11 RX ADMIN — HYDROCORTISONE ACETATE SCH SUPP: 25 SUPPOSITORY RECTAL at 21:42

## 2023-08-11 RX ADMIN — FUROSEMIDE SCH MG: 40 TABLET ORAL at 09:00

## 2023-08-11 RX ADMIN — FINASTERIDE SCH MG: 5 TABLET, FILM COATED ORAL at 09:00

## 2023-08-11 RX ADMIN — CYCLOSPORINE SCH ML: 0.5 EMULSION OPHTHALMIC at 10:11

## 2023-08-11 RX ADMIN — LUBIPROSTONE SCH MCG: 24 CAPSULE, GELATIN COATED ORAL at 09:00

## 2023-08-11 RX ADMIN — Medication SCH GM: at 21:42

## 2023-08-11 RX ADMIN — MULTIPLE VITAMINS W/ MINERALS TAB SCH TAB: TAB at 09:00

## 2023-08-11 RX ADMIN — POTASSIUM CHLORIDE SCH MEQ: 750 TABLET, FILM COATED, EXTENDED RELEASE ORAL at 09:00

## 2023-08-11 RX ADMIN — Medication SCH ML: at 16:17

## 2023-08-12 VITALS
HEART RATE: 85 BPM | TEMPERATURE: 99.1 F | SYSTOLIC BLOOD PRESSURE: 109 MMHG | RESPIRATION RATE: 16 BRPM | OXYGEN SATURATION: 91 %

## 2023-08-12 VITALS
SYSTOLIC BLOOD PRESSURE: 118 MMHG | OXYGEN SATURATION: 97 % | HEART RATE: 74 BPM | RESPIRATION RATE: 15 BRPM | TEMPERATURE: 98.8 F

## 2023-08-12 VITALS
TEMPERATURE: 98 F | OXYGEN SATURATION: 92 % | RESPIRATION RATE: 19 BRPM | SYSTOLIC BLOOD PRESSURE: 102 MMHG | HEART RATE: 74 BPM

## 2023-08-12 VITALS
OXYGEN SATURATION: 96 % | RESPIRATION RATE: 16 BRPM | SYSTOLIC BLOOD PRESSURE: 118 MMHG | TEMPERATURE: 98.4 F | HEART RATE: 87 BPM

## 2023-08-12 VITALS — TEMPERATURE: 98.1 F | OXYGEN SATURATION: 93 % | RESPIRATION RATE: 18 BRPM | HEART RATE: 78 BPM

## 2023-08-12 VITALS — OXYGEN SATURATION: 93 %

## 2023-08-12 VITALS
RESPIRATION RATE: 18 BRPM | TEMPERATURE: 98.1 F | SYSTOLIC BLOOD PRESSURE: 123 MMHG | OXYGEN SATURATION: 93 % | HEART RATE: 78 BPM

## 2023-08-12 VITALS — OXYGEN SATURATION: 92 %

## 2023-08-12 VITALS — HEART RATE: 89 BPM

## 2023-08-12 LAB
ANION GAP SERPL CALCULATED.3IONS-SCNC: 4 MMOL/L (ref 5–15)
BASOPHILS # BLD AUTO: 0 K/UL (ref 0–0.2)
BASOPHILS NFR BLD AUTO: 0.1 % (ref 0–2)
BUN SERPL-MCNC: 25 MG/DL (ref 8–21)
CALCIUM SERPL-MCNC: 8.3 MG/DL (ref 8.4–11)
CHLORIDE SERPL-SCNC: 108 MMOL/L (ref 98–107)
CO2 SERPLBLD-SCNC: 29 MMOL/L (ref 23–29)
CREAT SERPL-MCNC: 0.89 MG/DL (ref 0.55–1.3)
EOSINOPHIL # BLD AUTO: 0 K/UL (ref 0–0.4)
EOSINOPHIL NFR BLD AUTO: 0.4 % (ref 0–4)
ERYTHROCYTE [DISTWIDTH] IN BLOOD BY AUTOMATED COUNT: 23.2 % (ref 9–15)
ERYTHROCYTE [SEDIMENTATION RATE] IN BLOOD BY WESTERGREN METHOD: 40 MM/HR (ref 0–15)
GFR SERPL CREATININE-BSD FRML MDRD: (no result) ML/MIN (ref 90–?)
GLUCOSE SERPL-MCNC: 143 MG/DL (ref 74–106)
HCT VFR BLD AUTO: 27.9 % (ref 36–54)
HGB BLD-MCNC: 8.6 G/DL (ref 14–18)
LYMPHOCYTES # BLD AUTO: 1.1 K/UL (ref 1–5.5)
LYMPHOCYTES NFR BLD AUTO: 10.4 % (ref 20.5–51.5)
MCH RBC QN AUTO: 24 PG (ref 27–31)
MCHC RBC AUTO-ENTMCNC: 31 % (ref 32–36)
MCV RBC AUTO: 79 FL (ref 79–98)
MONOCYTES # BLD MANUAL: 0.9 K/UL (ref 0–1)
MONOCYTES # BLD MANUAL: 8.7 % (ref 1.7–9.3)
NEUTROPHILS # BLD AUTO: 8.1 K/UL (ref 1.8–7.7)
NEUTROPHILS NFR BLD AUTO: 80.4 % (ref 40–70)
PLATELET # BLD AUTO: 90 K/UL (ref 130–430)
POTASSIUM SERPL-SCNC: 3.6 MMOL/L (ref 3.5–5.1)
RBC # BLD AUTO: 3.54 MIL/UL (ref 4.2–6.2)
SODIUM SERPLBLD-SCNC: 141 MMOL/L (ref 136–145)
WBC # BLD AUTO: 10.1 K/UL (ref 4.8–10.8)

## 2023-08-12 PROCEDURE — 5A09357 ASSISTANCE WITH RESPIRATORY VENTILATION, LESS THAN 24 CONSECUTIVE HOURS, CONTINUOUS POSITIVE AIRWAY PRESSURE: ICD-10-PCS | Performed by: FAMILY MEDICINE

## 2023-08-12 RX ADMIN — Medication SCH GM: at 17:00

## 2023-08-12 RX ADMIN — HYDROCORTISONE ACETATE SCH SUPP: 25 SUPPOSITORY RECTAL at 21:00

## 2023-08-12 RX ADMIN — GABAPENTIN SCH MG: 300 CAPSULE ORAL at 14:51

## 2023-08-12 RX ADMIN — Medication SCH ML: at 23:41

## 2023-08-12 RX ADMIN — Medication SCH GM: at 09:00

## 2023-08-12 RX ADMIN — Medication SCH ML: at 14:49

## 2023-08-12 RX ADMIN — MECLIZINE HYDROCHLORIDE SCH MG: 25 TABLET ORAL at 08:32

## 2023-08-12 RX ADMIN — CYCLOSPORINE SCH ML: 0.5 EMULSION OPHTHALMIC at 10:13

## 2023-08-12 RX ADMIN — Medication SCH UNIT: at 08:31

## 2023-08-12 RX ADMIN — FUROSEMIDE SCH MG: 40 TABLET ORAL at 08:33

## 2023-08-12 RX ADMIN — LUBIPROSTONE SCH MCG: 24 CAPSULE, GELATIN COATED ORAL at 21:10

## 2023-08-12 RX ADMIN — SIMVASTATIN SCH MG: 40 TABLET, FILM COATED ORAL at 21:10

## 2023-08-12 RX ADMIN — FINASTERIDE SCH MG: 5 TABLET, FILM COATED ORAL at 08:31

## 2023-08-12 RX ADMIN — MECLIZINE HYDROCHLORIDE SCH MG: 25 TABLET ORAL at 21:10

## 2023-08-12 RX ADMIN — TAMSULOSIN HYDROCHLORIDE SCH MG: 0.4 CAPSULE ORAL at 08:32

## 2023-08-12 RX ADMIN — MULTIPLE VITAMINS W/ MINERALS TAB SCH TAB: TAB at 08:33

## 2023-08-12 RX ADMIN — HYDROCORTISONE ACETATE SCH SUPP: 25 SUPPOSITORY RECTAL at 21:13

## 2023-08-12 RX ADMIN — HYDROCORTISONE ACETATE SCH SUPP: 25 SUPPOSITORY RECTAL at 08:32

## 2023-08-12 RX ADMIN — Medication SCH GM: at 13:00

## 2023-08-12 RX ADMIN — GABAPENTIN SCH MG: 300 CAPSULE ORAL at 21:10

## 2023-08-12 RX ADMIN — MECLIZINE HYDROCHLORIDE SCH MG: 25 TABLET ORAL at 14:51

## 2023-08-12 RX ADMIN — GLYCERIN SCH DROP: .002; .002; .01 SOLUTION/ DROPS OPHTHALMIC at 08:34

## 2023-08-12 RX ADMIN — CYCLOSPORINE SCH ML: 0.5 EMULSION OPHTHALMIC at 21:13

## 2023-08-12 RX ADMIN — Medication SCH ML: at 05:40

## 2023-08-12 RX ADMIN — POTASSIUM CHLORIDE SCH MEQ: 750 TABLET, FILM COATED, EXTENDED RELEASE ORAL at 08:31

## 2023-08-12 RX ADMIN — GABAPENTIN SCH MG: 300 CAPSULE ORAL at 08:33

## 2023-08-12 RX ADMIN — Medication SCH GM: at 21:12

## 2023-08-12 RX ADMIN — SODIUM FERRIC GLUCONATE COMPLEX IN SUCROSE SCH MLS/HR: 12.5 INJECTION INTRAVENOUS at 17:12

## 2023-08-12 RX ADMIN — Medication SCH GM: at 08:33

## 2023-08-12 RX ADMIN — LUBIPROSTONE SCH MCG: 24 CAPSULE, GELATIN COATED ORAL at 08:31

## 2023-08-12 RX ADMIN — PANTOPRAZOLE SODIUM SCH MG: 40 TABLET, DELAYED RELEASE ORAL at 08:31

## 2023-08-12 RX ADMIN — Medication SCH GM: at 21:10

## 2023-08-12 RX ADMIN — Medication SCH MCG: at 08:33

## 2023-08-13 VITALS
SYSTOLIC BLOOD PRESSURE: 158 MMHG | RESPIRATION RATE: 16 BRPM | TEMPERATURE: 97.6 F | HEART RATE: 77 BPM | OXYGEN SATURATION: 98 %

## 2023-08-13 VITALS — HEART RATE: 85 BPM

## 2023-08-13 VITALS
OXYGEN SATURATION: 96 % | TEMPERATURE: 97.3 F | SYSTOLIC BLOOD PRESSURE: 120 MMHG | HEART RATE: 85 BPM | RESPIRATION RATE: 17 BRPM

## 2023-08-13 VITALS
HEART RATE: 105 BPM | RESPIRATION RATE: 16 BRPM | OXYGEN SATURATION: 96 % | SYSTOLIC BLOOD PRESSURE: 124 MMHG | TEMPERATURE: 97.4 F

## 2023-08-13 VITALS — HEART RATE: 77 BPM | SYSTOLIC BLOOD PRESSURE: 158 MMHG | OXYGEN SATURATION: 98 %

## 2023-08-13 VITALS
SYSTOLIC BLOOD PRESSURE: 124 MMHG | HEART RATE: 105 BPM | TEMPERATURE: 97.4 F | OXYGEN SATURATION: 96 % | RESPIRATION RATE: 16 BRPM

## 2023-08-13 VITALS
RESPIRATION RATE: 18 BRPM | HEART RATE: 85 BPM | OXYGEN SATURATION: 95 % | TEMPERATURE: 98.9 F | SYSTOLIC BLOOD PRESSURE: 118 MMHG

## 2023-08-13 VITALS
RESPIRATION RATE: 16 BRPM | TEMPERATURE: 98.7 F | SYSTOLIC BLOOD PRESSURE: 108 MMHG | HEART RATE: 92 BPM | OXYGEN SATURATION: 98 %

## 2023-08-13 VITALS — HEART RATE: 44 BPM

## 2023-08-13 VITALS — OXYGEN SATURATION: 98 %

## 2023-08-13 VITALS — OXYGEN SATURATION: 96 %

## 2023-08-13 LAB
ANION GAP SERPL CALCULATED.3IONS-SCNC: 7 MMOL/L (ref 5–15)
BASOPHILS # BLD AUTO: 0 K/UL (ref 0–0.2)
BASOPHILS NFR BLD AUTO: 0.1 % (ref 0–2)
BUN SERPL-MCNC: 28 MG/DL (ref 8–21)
CALCIUM SERPL-MCNC: 8 MG/DL (ref 8.4–11)
CHLORIDE SERPL-SCNC: 109 MMOL/L (ref 98–107)
CO2 SERPLBLD-SCNC: 29 MMOL/L (ref 23–29)
CREAT SERPL-MCNC: 0.93 MG/DL (ref 0.55–1.3)
EOSINOPHIL # BLD AUTO: 0 K/UL (ref 0–0.4)
EOSINOPHIL NFR BLD AUTO: 0.6 % (ref 0–4)
ERYTHROCYTE [DISTWIDTH] IN BLOOD BY AUTOMATED COUNT: 23.9 % (ref 9–15)
GFR SERPL CREATININE-BSD FRML MDRD: (no result) ML/MIN (ref 90–?)
GLUCOSE SERPL-MCNC: 114 MG/DL (ref 74–106)
HCT VFR BLD AUTO: 27.9 % (ref 36–54)
HGB BLD-MCNC: 8.7 G/DL (ref 14–18)
LYMPHOCYTES # BLD AUTO: 1.4 K/UL (ref 1–5.5)
LYMPHOCYTES NFR BLD AUTO: 17.4 % (ref 20.5–51.5)
MCH RBC QN AUTO: 25 PG (ref 27–31)
MCHC RBC AUTO-ENTMCNC: 31 % (ref 32–36)
MCV RBC AUTO: 80 FL (ref 79–98)
MONOCYTES # BLD MANUAL: 0.8 K/UL (ref 0–1)
MONOCYTES # BLD MANUAL: 9.8 % (ref 1.7–9.3)
NEUTROPHILS # BLD AUTO: 5.8 K/UL (ref 1.8–7.7)
NEUTROPHILS NFR BLD AUTO: 72.1 % (ref 40–70)
PLATELET # BLD AUTO: 81 K/UL (ref 130–430)
POTASSIUM SERPL-SCNC: 3.4 MMOL/L (ref 3.5–5.1)
RBC # BLD AUTO: 3.5 MIL/UL (ref 4.2–6.2)
SODIUM SERPLBLD-SCNC: 145 MMOL/L (ref 136–145)
WBC # BLD AUTO: 8 K/UL (ref 4.8–10.8)

## 2023-08-13 RX ADMIN — FINASTERIDE SCH MG: 5 TABLET, FILM COATED ORAL at 09:28

## 2023-08-13 RX ADMIN — Medication SCH ML: at 22:51

## 2023-08-13 RX ADMIN — CYCLOSPORINE SCH ML: 0.5 EMULSION OPHTHALMIC at 22:48

## 2023-08-13 RX ADMIN — Medication SCH MCG: at 09:29

## 2023-08-13 RX ADMIN — SODIUM FERRIC GLUCONATE COMPLEX IN SUCROSE SCH MLS/HR: 12.5 INJECTION INTRAVENOUS at 18:44

## 2023-08-13 RX ADMIN — GABAPENTIN SCH MG: 300 CAPSULE ORAL at 14:34

## 2023-08-13 RX ADMIN — FUROSEMIDE SCH MG: 40 TABLET ORAL at 09:30

## 2023-08-13 RX ADMIN — MECLIZINE HYDROCHLORIDE SCH MG: 25 TABLET ORAL at 14:34

## 2023-08-13 RX ADMIN — Medication SCH GM: at 22:49

## 2023-08-13 RX ADMIN — LUBIPROSTONE SCH MCG: 24 CAPSULE, GELATIN COATED ORAL at 09:29

## 2023-08-13 RX ADMIN — GABAPENTIN SCH MG: 300 CAPSULE ORAL at 22:49

## 2023-08-13 RX ADMIN — MECLIZINE HYDROCHLORIDE SCH MG: 25 TABLET ORAL at 09:29

## 2023-08-13 RX ADMIN — MULTIPLE VITAMINS W/ MINERALS TAB SCH TAB: TAB at 09:28

## 2023-08-13 RX ADMIN — MECLIZINE HYDROCHLORIDE SCH MG: 25 TABLET ORAL at 22:48

## 2023-08-13 RX ADMIN — PANTOPRAZOLE SODIUM SCH MG: 40 TABLET, DELAYED RELEASE ORAL at 09:30

## 2023-08-13 RX ADMIN — Medication SCH GM: at 18:45

## 2023-08-13 RX ADMIN — HYDROCORTISONE ACETATE SCH SUPP: 25 SUPPOSITORY RECTAL at 22:51

## 2023-08-13 RX ADMIN — GLYCERIN SCH DROP: .002; .002; .01 SOLUTION/ DROPS OPHTHALMIC at 09:32

## 2023-08-13 RX ADMIN — Medication SCH ML: at 14:32

## 2023-08-13 RX ADMIN — Medication SCH GM: at 13:24

## 2023-08-13 RX ADMIN — HYDROCORTISONE ACETATE SCH SUPP: 25 SUPPOSITORY RECTAL at 09:33

## 2023-08-13 RX ADMIN — Medication SCH GM: at 09:28

## 2023-08-13 RX ADMIN — Medication SCH ML: at 06:05

## 2023-08-13 RX ADMIN — Medication SCH GM: at 09:30

## 2023-08-13 RX ADMIN — GABAPENTIN SCH MG: 300 CAPSULE ORAL at 09:28

## 2023-08-13 RX ADMIN — CYCLOSPORINE SCH ML: 0.5 EMULSION OPHTHALMIC at 09:32

## 2023-08-13 RX ADMIN — POTASSIUM CHLORIDE SCH MEQ: 750 TABLET, FILM COATED, EXTENDED RELEASE ORAL at 09:29

## 2023-08-13 RX ADMIN — Medication SCH UNIT: at 09:29

## 2023-08-13 RX ADMIN — TAMSULOSIN HYDROCHLORIDE SCH MG: 0.4 CAPSULE ORAL at 09:29

## 2023-08-13 RX ADMIN — SIMVASTATIN SCH MG: 40 TABLET, FILM COATED ORAL at 22:50

## 2023-08-13 RX ADMIN — LUBIPROSTONE SCH MCG: 24 CAPSULE, GELATIN COATED ORAL at 22:48

## 2023-08-14 VITALS
OXYGEN SATURATION: 96 % | RESPIRATION RATE: 16 BRPM | HEART RATE: 101 BPM | SYSTOLIC BLOOD PRESSURE: 122 MMHG | TEMPERATURE: 97.2 F

## 2023-08-14 VITALS
HEART RATE: 94 BPM | TEMPERATURE: 99 F | SYSTOLIC BLOOD PRESSURE: 96 MMHG | RESPIRATION RATE: 19 BRPM | OXYGEN SATURATION: 95 %

## 2023-08-14 VITALS
RESPIRATION RATE: 18 BRPM | HEART RATE: 87 BPM | TEMPERATURE: 98.6 F | OXYGEN SATURATION: 94 % | SYSTOLIC BLOOD PRESSURE: 123 MMHG

## 2023-08-14 VITALS
RESPIRATION RATE: 18 BRPM | HEART RATE: 94 BPM | TEMPERATURE: 97.1 F | OXYGEN SATURATION: 95 % | SYSTOLIC BLOOD PRESSURE: 127 MMHG

## 2023-08-14 VITALS
TEMPERATURE: 98.9 F | OXYGEN SATURATION: 97 % | RESPIRATION RATE: 18 BRPM | SYSTOLIC BLOOD PRESSURE: 119 MMHG | HEART RATE: 89 BPM

## 2023-08-14 VITALS — OXYGEN SATURATION: 97 %

## 2023-08-14 LAB
ALBUMIN SERPL BCP-MCNC: 1.8 G/DL (ref 3.4–4.8)
ALT SERPL W P-5'-P-CCNC: 14 U/L (ref 12–78)
ANION GAP SERPL CALCULATED.3IONS-SCNC: 7 MMOL/L (ref 5–15)
APPEARANCE UR: CLEAR
AST SERPL W P-5'-P-CCNC: 40 U/L (ref 10–37)
BACTERIA URNS QL MICRO: (no result) /HPF
BASOPHILS # BLD AUTO: 0 K/UL (ref 0–0.2)
BASOPHILS NFR BLD AUTO: 0.2 % (ref 0–2)
BILIRUB SERPL-MCNC: 0.6 MG/DL (ref 0–1)
BILIRUB UR QL STRIP: (no result)
BUN SERPL-MCNC: 27 MG/DL (ref 8–21)
CALCIUM SERPL-MCNC: 7.8 MG/DL (ref 8.4–11)
CHLORIDE SERPL-SCNC: 110 MMOL/L (ref 98–107)
CO2 SERPLBLD-SCNC: 29 MMOL/L (ref 23–29)
COLOR UR: (no result)
CREAT SERPL-MCNC: 0.8 MG/DL (ref 0.55–1.3)
EOSINOPHIL # BLD AUTO: 0.1 K/UL (ref 0–0.4)
EOSINOPHIL NFR BLD AUTO: 0.8 % (ref 0–4)
ERYTHROCYTE [DISTWIDTH] IN BLOOD BY AUTOMATED COUNT: 25 % (ref 9–15)
GFR SERPL CREATININE-BSD FRML MDRD: (no result) ML/MIN (ref 90–?)
GLUCOSE SERPL-MCNC: 115 MG/DL (ref 74–106)
GLUCOSE UR STRIP-MCNC: NEGATIVE MG/DL
HCT VFR BLD AUTO: 28 % (ref 36–54)
HGB BLD-MCNC: 8.8 G/DL (ref 14–18)
HGB UR QL STRIP: NEGATIVE
KETONES UR STRIP-MCNC: (no result) MG/DL
LEUKOCYTE ESTERASE UR QL STRIP: NEGATIVE
LYMPHOCYTES # BLD AUTO: 1.5 K/UL (ref 1–5.5)
LYMPHOCYTES NFR BLD AUTO: 17.9 % (ref 20.5–51.5)
MCH RBC QN AUTO: 25 PG (ref 27–31)
MCHC RBC AUTO-ENTMCNC: 32 % (ref 32–36)
MCV RBC AUTO: 80 FL (ref 79–98)
MONOCYTES # BLD MANUAL: 0.7 K/UL (ref 0–1)
MONOCYTES # BLD MANUAL: 8.3 % (ref 1.7–9.3)
NEUTROPHILS # BLD AUTO: 6 K/UL (ref 1.8–7.7)
NEUTROPHILS NFR BLD AUTO: 72.8 % (ref 40–70)
NITRITE UR QL STRIP: NEGATIVE
PH UR STRIP: 6 [PH] (ref 5–8)
PLATELET # BLD AUTO: 90 K/UL (ref 130–430)
POTASSIUM SERPL-SCNC: 3.5 MMOL/L (ref 3.5–5.1)
PROT SERPL-MCNC: 5.2 G/DL (ref 6.4–8.3)
PROT UR QL STRIP: (no result)
RBC # BLD AUTO: 3.51 MIL/UL (ref 4.2–6.2)
RBC #/AREA URNS HPF: (no result) /HPF (ref 0–3)
SODIUM SERPLBLD-SCNC: 146 MMOL/L (ref 136–145)
SP GR UR STRIP: 1.01 (ref 1–1.03)
TIBC SERPL-MCNC: 216 UG/DL (ref 250–450)
UROBILINOGEN UR STRIP-MCNC: 4 MG/DL (ref 0.2–1)
WBC # BLD AUTO: 8.2 K/UL (ref 4.8–10.8)
WBC #/AREA URNS HPF: (no result) /HPF (ref 0–3)

## 2023-08-14 RX ADMIN — LUBIPROSTONE SCH MCG: 24 CAPSULE, GELATIN COATED ORAL at 21:12

## 2023-08-14 RX ADMIN — GLYCERIN SCH DROP: .002; .002; .01 SOLUTION/ DROPS OPHTHALMIC at 09:19

## 2023-08-14 RX ADMIN — MULTIPLE VITAMINS W/ MINERALS TAB SCH TAB: TAB at 09:18

## 2023-08-14 RX ADMIN — Medication SCH ML: at 05:50

## 2023-08-14 RX ADMIN — CYCLOSPORINE SCH ML: 0.5 EMULSION OPHTHALMIC at 21:15

## 2023-08-14 RX ADMIN — HYDROCORTISONE ACETATE SCH SUPP: 25 SUPPOSITORY RECTAL at 09:20

## 2023-08-14 RX ADMIN — FUROSEMIDE SCH MG: 40 TABLET ORAL at 09:18

## 2023-08-14 RX ADMIN — FINASTERIDE SCH MG: 5 TABLET, FILM COATED ORAL at 09:17

## 2023-08-14 RX ADMIN — MECLIZINE HYDROCHLORIDE SCH MG: 25 TABLET ORAL at 09:18

## 2023-08-14 RX ADMIN — Medication SCH MCG: at 09:17

## 2023-08-14 RX ADMIN — Medication SCH GM: at 21:13

## 2023-08-14 RX ADMIN — Medication SCH UNIT: at 09:17

## 2023-08-14 RX ADMIN — Medication SCH GM: at 14:56

## 2023-08-14 RX ADMIN — TAMSULOSIN HYDROCHLORIDE SCH MG: 0.4 CAPSULE ORAL at 09:17

## 2023-08-14 RX ADMIN — Medication SCH GM: at 17:10

## 2023-08-14 RX ADMIN — Medication SCH GM: at 21:12

## 2023-08-14 RX ADMIN — Medication SCH GM: at 09:17

## 2023-08-14 RX ADMIN — POTASSIUM CHLORIDE SCH MEQ: 750 TABLET, FILM COATED, EXTENDED RELEASE ORAL at 09:17

## 2023-08-14 RX ADMIN — GABAPENTIN SCH MG: 300 CAPSULE ORAL at 09:17

## 2023-08-14 RX ADMIN — CYCLOSPORINE SCH ML: 0.5 EMULSION OPHTHALMIC at 09:19

## 2023-08-14 RX ADMIN — PANTOPRAZOLE SODIUM SCH MG: 40 TABLET, DELAYED RELEASE ORAL at 09:17

## 2023-08-14 RX ADMIN — SODIUM FERRIC GLUCONATE COMPLEX IN SUCROSE SCH MLS/HR: 12.5 INJECTION INTRAVENOUS at 17:10

## 2023-08-14 RX ADMIN — Medication SCH ML: at 14:57

## 2023-08-14 RX ADMIN — Medication SCH ML: at 21:15

## 2023-08-14 RX ADMIN — LUBIPROSTONE SCH MCG: 24 CAPSULE, GELATIN COATED ORAL at 09:17

## 2023-08-15 VITALS
TEMPERATURE: 98.4 F | SYSTOLIC BLOOD PRESSURE: 124 MMHG | RESPIRATION RATE: 19 BRPM | HEART RATE: 85 BPM | OXYGEN SATURATION: 92 %

## 2023-08-15 VITALS
HEART RATE: 89 BPM | OXYGEN SATURATION: 96 % | RESPIRATION RATE: 17 BRPM | TEMPERATURE: 97.8 F | SYSTOLIC BLOOD PRESSURE: 115 MMHG

## 2023-08-15 VITALS
TEMPERATURE: 97.1 F | HEART RATE: 89 BPM | OXYGEN SATURATION: 95 % | SYSTOLIC BLOOD PRESSURE: 99 MMHG | RESPIRATION RATE: 18 BRPM

## 2023-08-15 VITALS
TEMPERATURE: 97.8 F | HEART RATE: 89 BPM | OXYGEN SATURATION: 96 % | RESPIRATION RATE: 17 BRPM | SYSTOLIC BLOOD PRESSURE: 115 MMHG

## 2023-08-15 VITALS
OXYGEN SATURATION: 94 % | SYSTOLIC BLOOD PRESSURE: 114 MMHG | TEMPERATURE: 97.8 F | RESPIRATION RATE: 17 BRPM | HEART RATE: 85 BPM

## 2023-08-15 VITALS — OXYGEN SATURATION: 96 %

## 2023-08-15 LAB
BASOPHILS # BLD AUTO: 0 K/UL (ref 0–0.2)
BASOPHILS NFR BLD AUTO: 0.3 % (ref 0–2)
EOSINOPHIL # BLD AUTO: 0.1 K/UL (ref 0–0.4)
EOSINOPHIL NFR BLD AUTO: 0.7 % (ref 0–4)
ERYTHROCYTE [DISTWIDTH] IN BLOOD BY AUTOMATED COUNT: 26.1 % (ref 9–15)
HCT VFR BLD AUTO: 30.1 % (ref 36–54)
HGB BLD-MCNC: 9.3 G/DL (ref 14–18)
LYMPHOCYTES # BLD AUTO: 1.3 K/UL (ref 1–5.5)
LYMPHOCYTES NFR BLD AUTO: 14.3 % (ref 20.5–51.5)
MCH RBC QN AUTO: 25 PG (ref 27–31)
MCHC RBC AUTO-ENTMCNC: 31 % (ref 32–36)
MCV RBC AUTO: 82 FL (ref 79–98)
MONOCYTES # BLD MANUAL: 0.7 K/UL (ref 0–1)
MONOCYTES # BLD MANUAL: 8.1 % (ref 1.7–9.3)
NEUTROPHILS # BLD AUTO: 7 K/UL (ref 1.8–7.7)
NEUTROPHILS NFR BLD AUTO: 76.6 % (ref 40–70)
PLATELET # BLD AUTO: 96 K/UL (ref 130–430)
RBC # BLD AUTO: 3.69 MIL/UL (ref 4.2–6.2)
WBC # BLD AUTO: 9.1 K/UL (ref 4.8–10.8)

## 2023-08-15 RX ADMIN — TAMSULOSIN HYDROCHLORIDE SCH MG: 0.4 CAPSULE ORAL at 08:40

## 2023-08-15 RX ADMIN — Medication SCH GM: at 08:43

## 2023-08-15 RX ADMIN — POTASSIUM CHLORIDE SCH MEQ: 750 TABLET, FILM COATED, EXTENDED RELEASE ORAL at 08:41

## 2023-08-15 RX ADMIN — Medication SCH GM: at 08:40

## 2023-08-15 RX ADMIN — Medication SCH ML: at 14:00

## 2023-08-15 RX ADMIN — Medication SCH ML: at 05:07

## 2023-08-15 RX ADMIN — GLYCERIN SCH DROP: .002; .002; .01 SOLUTION/ DROPS OPHTHALMIC at 08:42

## 2023-08-15 RX ADMIN — CYCLOSPORINE SCH ML: 0.5 EMULSION OPHTHALMIC at 08:43

## 2023-08-15 RX ADMIN — MULTIPLE VITAMINS W/ MINERALS TAB SCH TAB: TAB at 08:41

## 2023-08-15 RX ADMIN — LUBIPROSTONE SCH MCG: 24 CAPSULE, GELATIN COATED ORAL at 08:40

## 2023-08-15 RX ADMIN — PANTOPRAZOLE SODIUM SCH MG: 40 TABLET, DELAYED RELEASE ORAL at 08:40

## 2023-08-15 RX ADMIN — Medication SCH MCG: at 08:40

## 2023-08-15 RX ADMIN — FINASTERIDE SCH MG: 5 TABLET, FILM COATED ORAL at 08:41

## 2023-08-15 RX ADMIN — Medication SCH GM: at 13:00

## 2023-08-15 RX ADMIN — FUROSEMIDE SCH MG: 40 TABLET ORAL at 08:41

## 2023-08-15 RX ADMIN — Medication SCH UNIT: at 08:40
